# Patient Record
Sex: FEMALE | Race: WHITE | URBAN - METROPOLITAN AREA
[De-identification: names, ages, dates, MRNs, and addresses within clinical notes are randomized per-mention and may not be internally consistent; named-entity substitution may affect disease eponyms.]

---

## 2021-11-09 ENCOUNTER — OFFICE VISIT (OUTPATIENT)
Dept: URBAN - METROPOLITAN AREA CLINIC 64 | Facility: CLINIC | Age: 71
End: 2021-11-09
Payer: MEDICARE

## 2021-11-09 PROCEDURE — 92134 CPTRZ OPH DX IMG PST SGM RTA: CPT | Performed by: OPTOMETRIST

## 2021-11-09 PROCEDURE — 99204 OFFICE O/P NEW MOD 45 MIN: CPT | Performed by: OPHTHALMOLOGY

## 2021-11-09 PROCEDURE — 92134 CPTRZ OPH DX IMG PST SGM RTA: CPT | Performed by: OPHTHALMOLOGY

## 2021-11-09 PROCEDURE — 99204 OFFICE O/P NEW MOD 45 MIN: CPT | Performed by: OPTOMETRIST

## 2021-11-09 PROCEDURE — 92250 FUNDUS PHOTOGRAPHY W/I&R: CPT | Performed by: OPHTHALMOLOGY

## 2021-11-09 ASSESSMENT — INTRAOCULAR PRESSURE
OS: 17
OS: 17
OD: 14
OD: 14

## 2021-11-09 NOTE — IMPRESSION/PLAN
Impression:  RPE chng  AMD / ATROPHY Plan: Non smoker.   Recommend ARED 2 (PRN)  RPE chng  ATROPHY OS.  MAY BE LIMITING in the oD as well even w successful repair -- AMD

## 2021-11-09 NOTE — IMPRESSION/PLAN
Impression: Retinal detachment with single break, right eye: H33.011. Plan: Superior and temporal RD OD. Symptoms started yesterday. Appears to be mac off but superior detachment is draped over the macula so view is limited. She had a fall about 2 months ago. Consult with a retina specialist ASAP.

## 2021-11-09 NOTE — IMPRESSION/PLAN
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 She had a fall about 2 months ago - likely unrelated w no ocular trauma. RD RIGHT eye -- Mac off OD large bullous -- PCIOL OU --  Detailed specialty exam of retina (incl scleral depressed exam) confirms retinal detachment (RD). Prompt Re-attachement surgery indicated and will be arranged. Even w successful surgery, risk of progressive or recur RD is est. 10-15%. URGED pt return immediately for repeat retinal eval if progressive/new symptoms (floaters, photopsias, field loss, etc). Vision will not recover to NML even w successful re-attachment. In some cases (1-5% est.)  retina NEVER re-attaches despite best efforts. Extensive education. All questions answered /understood. Proceed w RD repair surgx. VIT / Laser OIL - pt from [place] in 1-2wk IF OIL, would be allowed to travel w/in 24h but need OIL REMOVAL in [address].

## 2021-11-11 ENCOUNTER — ADULT PHYSICAL (OUTPATIENT)
Dept: URBAN - METROPOLITAN AREA CLINIC 64 | Facility: CLINIC | Age: 71
End: 2021-11-11
Payer: MEDICARE

## 2021-11-11 DIAGNOSIS — Z01.818 ENCOUNTER FOR OTHER PREPROCEDURAL EXAMINATION: Primary | ICD-10-CM

## 2021-11-11 PROCEDURE — 99203 OFFICE O/P NEW LOW 30 MIN: CPT | Performed by: NURSE PRACTITIONER

## 2021-11-11 RX ORDER — ANASTROZOLE 1 MG/1
1 MG TABLET, FILM COATED ORAL
Qty: 0 | Refills: 0 | Status: ACTIVE
Start: 2021-11-11

## 2021-11-16 ENCOUNTER — SURGERY (OUTPATIENT)
Dept: URBAN - METROPOLITAN AREA SURGERY 5 | Facility: SURGERY | Age: 71
End: 2021-11-16
Payer: MEDICARE

## 2021-11-16 PROCEDURE — 67108 REPAIR DETACHED RETINA: CPT | Performed by: OPHTHALMOLOGY

## 2021-11-17 ENCOUNTER — POST-OPERATIVE VISIT (OUTPATIENT)
Dept: URBAN - METROPOLITAN AREA CLINIC 64 | Facility: CLINIC | Age: 71
End: 2021-11-17
Payer: MEDICARE

## 2021-11-17 DIAGNOSIS — Z48.810 ENCOUNTER FOR SURGICAL AFTERCARE FOLLOWING SURGERY ON A SENSE ORGAN: Primary | ICD-10-CM

## 2021-11-17 PROCEDURE — 99024 POSTOP FOLLOW-UP VISIT: CPT | Performed by: OPTOMETRIST

## 2021-11-17 ASSESSMENT — INTRAOCULAR PRESSURE: OD: 9

## 2021-11-17 NOTE — IMPRESSION/PLAN
Impression: S/P Posterior Vitrectomy (PPVIT)/laser; Capsulotomy/RD Repair; PFO; Oil 1000 OD - 1 Day. Encounter for surgical aftercare following surgery on a sense organ  Z48.810. Plan: S/P mac off RD repair with SO / cap OD - doing well. RTC with Dr. Amos Tinajero as scheduled 2 wks.

## 2021-12-02 ENCOUNTER — OFFICE VISIT (OUTPATIENT)
Dept: URBAN - METROPOLITAN AREA CLINIC 64 | Facility: CLINIC | Age: 71
End: 2021-12-02
Payer: MEDICARE

## 2021-12-02 PROCEDURE — 99024 POSTOP FOLLOW-UP VISIT: CPT | Performed by: OPHTHALMOLOGY

## 2021-12-02 PROCEDURE — 92134 CPTRZ OPH DX IMG PST SGM RTA: CPT | Performed by: OPHTHALMOLOGY

## 2021-12-02 ASSESSMENT — INTRAOCULAR PRESSURE
OD: 18
OS: 17

## 2021-12-02 NOTE — IMPRESSION/PLAN
Impression: RPE chng  AMD / ATROPHY Plan: Non smoker.   Recommend ARED 2 (PRN)  RPE chng  ATROPHY OS.  MAY BE LIMITING in the oD as well even w successful repair -- AMD LIMITING ISSUE

## 2021-12-02 NOTE — IMPRESSION/PLAN
Impression: RD mac off OD large bullous - VIT/RD rpr/Lsr/OIL Nov '21 Jennifer Plan: hx: [[Superior and temporal RD OD. She had a fall about 2m ago - likely unrelated w no ocular trauma. RD RIGHT eye -- Mac off OD large bullous -- PCIOL OU]] PROMPT surgery done - VIT/RD rpr/Lsr/OIL Nov '21 Tatyana Chin TODAY postop healing well. ATTACHED under oil. No postop CME 
RTC IOP check JULITO et al 1mo. RETINA 6-8w pos OPTOS and exam.  IF stable, plan VIT/OIL Rmv'l AFx OD Flag REMINDED pt:   Even w successful surgery, risk of progressive or recur RD is est. 10-15%. URGED pt return immediately for repeat retinal eval if progressive/new symptoms (floaters, photopsias, field loss, etc). Vision will not recover to NML even w successful re-attachment.   In some cases (1-5% est.)  retina NEVER re-attaches

## 2022-01-13 ENCOUNTER — OFFICE VISIT (OUTPATIENT)
Dept: URBAN - METROPOLITAN AREA CLINIC 64 | Facility: CLINIC | Age: 72
End: 2022-01-13
Payer: MEDICARE

## 2022-01-13 DIAGNOSIS — H33.011 RETINAL DETACHMENT WITH SINGLE BREAK, RIGHT EYE: Primary | ICD-10-CM

## 2022-01-13 DIAGNOSIS — H35.30 UNSPECIFIED MACULAR DEGENERATION: ICD-10-CM

## 2022-01-13 PROCEDURE — 92250 FUNDUS PHOTOGRAPHY W/I&R: CPT | Performed by: OPHTHALMOLOGY

## 2022-01-13 PROCEDURE — 99024 POSTOP FOLLOW-UP VISIT: CPT | Performed by: OPHTHALMOLOGY

## 2022-01-13 PROCEDURE — 92134 CPTRZ OPH DX IMG PST SGM RTA: CPT | Performed by: OPHTHALMOLOGY

## 2022-01-13 ASSESSMENT — INTRAOCULAR PRESSURE
OD: 9
OS: 14

## 2022-01-13 NOTE — IMPRESSION/PLAN
Impression: RPE chng  AMD / ATROPHY Plan: AMD - AREDs2 -- RPE chng  ATROPHY OU -- MAY BE LIMITING in OU --- even w successful surger OD, AMD may be LIMITING ISSUE ATROPHY

## 2022-01-13 NOTE — IMPRESSION/PLAN
Impression: RD mac off OD large bullous - VIT/RD rpr/Lsr/OIL Nov '21 Carmelita Baar HEALED / Attached '21-22 Plan: hx: [[Superior and temporal RD OD. She had a fall about 2m ago - likely unrelated w no ocular trauma. RD RIGHT eye -- Mac off OD large bullous -- PCIOL OU]] PROMPT surgery done - VIT/RD rpr/Lsr/OIL Nov '21 Carmelita Baar HEALED / Attached '21-22 now under OIL ATTACHED -  No postop CME    
     TODAY stable -- OK to plan VIT/OIL Rmv'l (1k Cs OIL) AFx OD Flag in 6-8w Post op get OPTOS possible again. REMINDED pt:   Even w successful surgery, risk of progressive or recur RD is est. 10-15%. URGED pt return immediately for repeat retinal eval if progressive/new symptoms (floaters, photopsias, field loss, etc). Vision will not recover to NML even w successful re-attachment.   In some cases (1-5% est.)  retina NEVER re-attaches

## 2022-01-21 ENCOUNTER — ADULT PHYSICAL (OUTPATIENT)
Dept: URBAN - METROPOLITAN AREA CLINIC 64 | Facility: CLINIC | Age: 72
End: 2022-01-21
Payer: MEDICARE

## 2022-01-21 PROCEDURE — 99213 OFFICE O/P EST LOW 20 MIN: CPT | Performed by: NURSE PRACTITIONER

## 2022-01-24 ENCOUNTER — POST-OPERATIVE VISIT (OUTPATIENT)
Dept: URBAN - METROPOLITAN AREA CLINIC 64 | Facility: CLINIC | Age: 72
End: 2022-01-24
Payer: MEDICARE

## 2022-01-24 PROCEDURE — 99024 POSTOP FOLLOW-UP VISIT: CPT | Performed by: OPTOMETRIST

## 2022-01-24 ASSESSMENT — INTRAOCULAR PRESSURE
OS: 12
OD: 13

## 2022-01-24 NOTE — IMPRESSION/PLAN
Impression: S/P Posterior Vitrectomy (PPVIT)/laser; Capsulotomy/RD Repair; PFO; Oil 1000 OD - 69 Days. Encounter for surgical aftercare following surgery on a sense organ  Z48.810. Plan: S/P mac off RD repair with SO / cap OD - doing well. Normal IOP. SO removal scheduled w/ Dr. Lissa Tran in 4 days.

## 2022-01-28 ENCOUNTER — SURGERY (OUTPATIENT)
Dept: URBAN - METROPOLITAN AREA SURGERY 5 | Facility: SURGERY | Age: 72
End: 2022-01-28
Payer: MEDICARE

## 2022-01-28 PROCEDURE — 67040 LASER TREATMENT OF RETINA: CPT | Performed by: OPHTHALMOLOGY

## 2022-01-28 RX ORDER — OFLOXACIN 3 MG/ML
0.3 % SOLUTION/ DROPS OPHTHALMIC
Qty: 5 | Refills: 1 | Status: ACTIVE
Start: 2022-01-28

## 2022-01-28 RX ORDER — PREDNISOLONE ACETATE 10 MG/ML
1 % SUSPENSION/ DROPS OPHTHALMIC
Qty: 5 | Refills: 1 | Status: ACTIVE
Start: 2022-01-28

## 2022-01-29 ENCOUNTER — POST-OPERATIVE VISIT (OUTPATIENT)
Dept: URBAN - METROPOLITAN AREA CLINIC 10 | Facility: CLINIC | Age: 72
End: 2022-01-29
Payer: MEDICARE

## 2022-01-29 PROCEDURE — 99024 POSTOP FOLLOW-UP VISIT: CPT | Performed by: OPTOMETRIST

## 2022-01-29 ASSESSMENT — INTRAOCULAR PRESSURE: OD: 10

## 2022-01-29 NOTE — IMPRESSION/PLAN
Impression: S/P Posterior Vitrectomy (PPVIT)/OIL Rmv'l (1k Cs OIL) AFx/laser OD - 1 Day. Encounter for surgical aftercare following surgery on a sense organ  Z48.810.  Plan: pt doing well, mild k abrasion, use ATs/gel QID OD, keep next post op

## 2022-02-10 ENCOUNTER — OFFICE VISIT (OUTPATIENT)
Dept: URBAN - METROPOLITAN AREA CLINIC 64 | Facility: CLINIC | Age: 72
End: 2022-02-10
Payer: MEDICARE

## 2022-02-10 PROCEDURE — 92134 CPTRZ OPH DX IMG PST SGM RTA: CPT | Performed by: OPHTHALMOLOGY

## 2022-02-10 PROCEDURE — 99024 POSTOP FOLLOW-UP VISIT: CPT | Performed by: OPHTHALMOLOGY

## 2022-02-10 ASSESSMENT — INTRAOCULAR PRESSURE
OS: 17
OD: 17

## 2022-02-10 NOTE — IMPRESSION/PLAN
Impression: RPE chng  AMD / ATROPHY Plan: AMD - AREDs2 -- RPE chng  ATROPHY OU -- MAY BE LIMITING in OU --- even w successful surger OD, AMD may be LIMITING ISSUE ATROPHY -- reviewed w pt.    
  F/u RETINA PRN w pos OPTOS if CNVM formation